# Patient Record
Sex: FEMALE | URBAN - METROPOLITAN AREA
[De-identification: names, ages, dates, MRNs, and addresses within clinical notes are randomized per-mention and may not be internally consistent; named-entity substitution may affect disease eponyms.]

---

## 2023-06-06 ENCOUNTER — OFFICE VISIT (OUTPATIENT)
Facility: LOCATION | Age: 63
End: 2023-06-06
Payer: COMMERCIAL

## 2023-06-06 DIAGNOSIS — H11.33 CONJUNCTIVAL HEMORRHAGE, BILATERAL: Primary | ICD-10-CM

## 2023-06-06 DIAGNOSIS — H04.123 DRY EYE SYNDROME OF BILATERAL LACRIMAL GLANDS: ICD-10-CM

## 2023-06-06 PROCEDURE — 99213 OFFICE O/P EST LOW 20 MIN: CPT | Performed by: OPHTHALMOLOGY

## 2023-06-06 RX ORDER — PREDNISOLONE ACETATE 10 MG/ML
1 % SUSPENSION/ DROPS OPHTHALMIC
Qty: 10 | Refills: 2 | Status: ACTIVE
Start: 2023-06-06

## 2023-06-06 ASSESSMENT — INTRAOCULAR PRESSURE
OS: 12
OD: 12

## 2023-06-06 NOTE — IMPRESSION/PLAN
Impression: Examination revealed hx of  subconjunctival hemorrhage. Patient states she woke with her eye being red today around 5:00AM patient explains she has throbbing. Patient denies taking aspirin, high blood pressure medication. Denies rubbing eyes. No signs of   subconjunctival hemorrhage during exam. Plan: Patient to start Prednisolone TID OD x1 week, BID OD x1 week and then QD x1 week then stop. RTC in 6 weeks for re-valuation with Dr. Maru Johnson.